# Patient Record
Sex: MALE | Race: BLACK OR AFRICAN AMERICAN | Employment: UNEMPLOYED | URBAN - METROPOLITAN AREA
[De-identification: names, ages, dates, MRNs, and addresses within clinical notes are randomized per-mention and may not be internally consistent; named-entity substitution may affect disease eponyms.]

---

## 2022-02-25 PROBLEM — Z86.69 HISTORY OF SLEEP DISTURBANCE: Status: ACTIVE | Noted: 2022-02-25

## 2022-02-28 ENCOUNTER — HOSPITAL ENCOUNTER (OUTPATIENT)
Age: 52
Setting detail: SPECIMEN
Discharge: HOME | End: 2022-02-28
Payer: COMMERCIAL

## 2022-02-28 DIAGNOSIS — Z76.89 ESTABLISHING CARE WITH NEW DOCTOR, ENCOUNTER FOR: ICD-10-CM

## 2022-02-28 DIAGNOSIS — Z13.1 DIABETES MELLITUS SCREENING: ICD-10-CM

## 2022-02-28 DIAGNOSIS — R36.9 PENILE DISCHARGE: ICD-10-CM

## 2022-02-28 DIAGNOSIS — Z12.5 PROSTATE CANCER SCREENING: ICD-10-CM

## 2022-02-28 DIAGNOSIS — Z11.4 ENCOUNTER FOR SCREENING FOR HIV: ICD-10-CM

## 2022-02-28 DIAGNOSIS — Z11.59 ENCOUNTER FOR HEPATITIS C SCREENING TEST FOR LOW RISK PATIENT: ICD-10-CM

## 2022-02-28 DIAGNOSIS — Z01.89 ENCOUNTER FOR ROUTINE LABORATORY TESTING: ICD-10-CM

## 2022-02-28 LAB
ESTIMATED AVERAGE GLUCOSE: 146 MG/DL
GLUCOSE FASTING: 103 MG/DL (ref 70–99)
HBA1C MFR BLD: 6.7 % (ref 4–6)
HEPATITIS C ANTIBODY: NONREACTIVE
HIV AG/AB: NONREACTIVE
PROSTATE SPECIFIC ANTIGEN: 0.34 UG/L

## 2022-02-28 PROCEDURE — 86803 HEPATITIS C AB TEST: CPT

## 2022-02-28 PROCEDURE — 87491 CHLMYD TRACH DNA AMP PROBE: CPT

## 2022-02-28 PROCEDURE — 87389 HIV-1 AG W/HIV-1&-2 AB AG IA: CPT

## 2022-02-28 PROCEDURE — 36415 COLL VENOUS BLD VENIPUNCTURE: CPT

## 2022-02-28 PROCEDURE — 87591 N.GONORRHOEAE DNA AMP PROB: CPT

## 2022-02-28 PROCEDURE — 82947 ASSAY GLUCOSE BLOOD QUANT: CPT

## 2022-02-28 PROCEDURE — G0103 PSA SCREENING: HCPCS

## 2022-02-28 PROCEDURE — 83036 HEMOGLOBIN GLYCOSYLATED A1C: CPT

## 2022-03-01 LAB
C. TRACHOMATIS DNA ,URINE: NEGATIVE
N. GONORRHOEAE DNA, URINE: NEGATIVE
SPECIMEN DESCRIPTION: NORMAL

## 2022-03-03 PROBLEM — E11.9 NEW ONSET TYPE 2 DIABETES MELLITUS (HCC): Status: ACTIVE | Noted: 2022-03-03

## 2022-07-13 NOTE — PROGRESS NOTES
Patient instructed to remove shoes and socks and instructed to sit in exam chair. Current PCP is HARRISON Wolfe CNP and date of last visit was 07/08/2022. Do you have a follow up visit scheduled?   No  If yes, the date is

## 2022-07-22 ENCOUNTER — OFFICE VISIT (OUTPATIENT)
Dept: PODIATRY | Age: 52
End: 2022-07-22
Payer: COMMERCIAL

## 2022-07-22 VITALS
DIASTOLIC BLOOD PRESSURE: 74 MMHG | BODY MASS INDEX: 35.99 KG/M2 | SYSTOLIC BLOOD PRESSURE: 121 MMHG | WEIGHT: 243 LBS | HEIGHT: 69 IN | RESPIRATION RATE: 76 BRPM

## 2022-07-22 DIAGNOSIS — M79.672 PAIN IN LEFT FOOT: ICD-10-CM

## 2022-07-22 DIAGNOSIS — M24.573 EQUINUS CONTRACTURE OF ANKLE: ICD-10-CM

## 2022-07-22 DIAGNOSIS — M72.2 PLANTAR FASCIITIS, BILATERAL: Primary | ICD-10-CM

## 2022-07-22 PROCEDURE — 3017F COLORECTAL CA SCREEN DOC REV: CPT | Performed by: STUDENT IN AN ORGANIZED HEALTH CARE EDUCATION/TRAINING PROGRAM

## 2022-07-22 PROCEDURE — G8417 CALC BMI ABV UP PARAM F/U: HCPCS | Performed by: STUDENT IN AN ORGANIZED HEALTH CARE EDUCATION/TRAINING PROGRAM

## 2022-07-22 PROCEDURE — 4004F PT TOBACCO SCREEN RCVD TLK: CPT | Performed by: STUDENT IN AN ORGANIZED HEALTH CARE EDUCATION/TRAINING PROGRAM

## 2022-07-22 PROCEDURE — 99203 OFFICE O/P NEW LOW 30 MIN: CPT | Performed by: STUDENT IN AN ORGANIZED HEALTH CARE EDUCATION/TRAINING PROGRAM

## 2022-07-22 PROCEDURE — G8427 DOCREV CUR MEDS BY ELIG CLIN: HCPCS | Performed by: STUDENT IN AN ORGANIZED HEALTH CARE EDUCATION/TRAINING PROGRAM

## 2022-07-22 RX ORDER — METHYLPREDNISOLONE 4 MG/1
TABLET ORAL
Qty: 1 KIT | Refills: 0 | Status: SHIPPED | OUTPATIENT
Start: 2022-07-22 | End: 2022-07-28

## 2022-07-22 NOTE — PROGRESS NOTES
[employer] [facility]  [address] [place] [address],  MARIE Torres  Tel: [phone]   Fax: [phone]    Subjective     CC: right heel pain    HPI:  Deloris Naranjo is a 46y.o. year old male who presents to clinic today for complaints of heel pain to the right foot. Patient reports that he has had the pain for the last 2 weeks. Patient states that the pain is significant worse in the morning. Patient states that he is also starting to experience the pain in his left foot. Patient denies any significant problems. Patient has no other pedal complaints. Primary care physician is Spencer Meckel, APRN - CNP.    ROS:    Constitutional: Denies nausea, vomiting, fever, chills. Neurologic: Denies numbness, tingling, and burning in the feet. Vascular: Denies symptoms of lower extremity claudication. Skin: Denies open wounds. Musculoskeletal: Right heel pain, left foot pain. Otherwise negative except as noted in the HPI. PMH:  Past Medical History:   Diagnosis Date    Anxiety     Asthma        Surgical History:   Past Surgical History:   Procedure Laterality Date    FEMUR FRACTURE SURGERY Right     rods    HIP FRACTURE SURGERY Bilateral     rods    TONSILLECTOMY         Social History:  Social History     Tobacco Use    Smoking status: Some Days     Packs/day: 0.10     Years: 15.00     Pack years: 1.50     Types: Cigars, Cigarettes    Smokeless tobacco: Never   Vaping Use    Vaping Use: Never used   Substance Use Topics    Alcohol use: Yes     Comment: social    Drug use: No       Medications:  Prior to Admission medications    Medication Sig Start Date End Date Taking? Authorizing Provider   methylPREDNISolone (MEDROL DOSEPACK) 4 MG tablet Take by mouth. 7/22/22 7/28/22 Yes Massimo Burnham DPM   gabapentin (NEURONTIN) 100 MG capsule Take 1 capsule by mouth 2 times daily for 30 days.  Intended supply: 30 days 7/8/22 8/7/22 Yes Spencer Meckel, APRN - CNP   metFORMIN (GLUCOPHAGE-XR) 500 mg extended release tablet Take 1 tablet by mouth daily (with breakfast) 5/27/22  Yes HARRISON Wesley CNP   Melatonin 10 MG TABS Take 1 tablet by mouth nightly as needed (Sleep) 2/25/22  Yes HARRISON Wesley CNP       Objective     Vitals:    07/22/22 1405   BP: 121/74   Resp: (!) 76       Lab Results   Component Value Date    LABA1C 6.1 05/27/2022       Physical Exam:  General:  Alert and oriented x3. In no acute distress. Lower Extremity Physical Exam:    Vascular: DP and PT pulses are palpable. CFT <3 seconds to all digits. Mild plantar edema to b/l foot. Hair growth is absent to the level of the digits. Neuro: Saph/sural/SP/DP/plantar sensation intact to light touch. Musculoskeletal: Muscle strength tests 5/5 to al major muscle groups. Tenderness to palpation of medial tubercle of the b/l  calcaneus. No significant pain to palpation at any other spot. Decreased ankle range of motion. Dermatologic:no open lesions. Interdigital maceration absent. Nails 1-5 are wnl. Imaging: none    Assessment   Deon Black is a 46 y.o. male with     Diagnosis Orders   1. Plantar fasciitis, bilateral        2. Pain in left foot        3. Equinus contracture of ankle             Plan   The patient presented to our clinic today for plantar fasciitis  Recommend patient obtain shoes with good support and also obtain powerstep orthotics. Insturcted patient on at home calf stretches. Rx for medrol dosepak sent to patient's pharmacy. We discussed steroid injection into the plantar fascia, patient declined at this time. .   Patient to return to clinic in 1 month to follow-up. Please call the office with any questions or concerns. Orders Placed This Encounter   Medications    methylPREDNISolone (MEDROL DOSEPACK) 4 MG tablet     Sig: Take by mouth. Dispense:  1 kit     Refill:  0     No orders of the defined types were placed in this encounter.       Kailey Rowley DPM   Podiatric Medicine &

## 2023-01-04 ENCOUNTER — NURSE TRIAGE (OUTPATIENT)
Dept: OTHER | Facility: CLINIC | Age: 53
End: 2023-01-04

## 2023-01-04 NOTE — TELEPHONE ENCOUNTER
Location of patient: [address] call from Adrian at [facility] with [facility]. Subjective: Caller states \"having dizziness, sob, night sweats for the past week\"     Current Symptoms: He has been having night sweats, will wake in middle of night with blurry vision, feeling sob, dry mouth and dizzy. Will go outside and come back feel better. This is now happening every night. During the day he is fine. Patient girlfriend, Sher Gipson, calling. Patient is currently sleeping. Limitied triage    Onset: 2 weeks ago; gradual    Associated Symptoms: NA    Pain Severity:     Temperature:      What has been tried:     LMP: NA Pregnant: NA    Recommended disposition: See PCP within 3 Days    Care advice provided, patient verbalizes understanding; denies any other questions or concerns; instructed to call back for any new or worsening symptoms. Patient/Caller agrees with recommended disposition; writer provided warm transfer to [facility] at [facility] for appointment scheduling    Attention Provider: Thank you for allowing me to participate in the care of your patient. The patient was connected to triage in response to information provided to the ECC/PSC. Please do not respond through this encounter as the response is not directed to a shared pool.     Reason for Disposition   MILD dizziness (e.g., walking normally) and has NOT been evaluated by physician for this (Exception: dizziness caused by heat exposure, sudden standing, or poor fluid intake)    Protocols used: Dizziness-ADULT-[place]

## 2023-01-06 ENCOUNTER — OFFICE VISIT (OUTPATIENT)
Dept: FAMILY MEDICINE CLINIC | Age: 53
End: 2023-01-06
Payer: COMMERCIAL

## 2023-01-06 VITALS
BODY MASS INDEX: 38.18 KG/M2 | TEMPERATURE: 96.6 F | SYSTOLIC BLOOD PRESSURE: 135 MMHG | HEART RATE: 76 BPM | WEIGHT: 257.8 LBS | HEIGHT: 69 IN | DIASTOLIC BLOOD PRESSURE: 76 MMHG

## 2023-01-06 DIAGNOSIS — Z13.220 SCREENING FOR HYPERLIPIDEMIA: ICD-10-CM

## 2023-01-06 DIAGNOSIS — R40.0 DAYTIME SLEEPINESS: ICD-10-CM

## 2023-01-06 DIAGNOSIS — E66.09 CLASS 2 OBESITY DUE TO EXCESS CALORIES WITH BODY MASS INDEX (BMI) OF 38.0 TO 38.9 IN ADULT, UNSPECIFIED WHETHER SERIOUS COMORBIDITY PRESENT: ICD-10-CM

## 2023-01-06 DIAGNOSIS — R06.83 SNORING: ICD-10-CM

## 2023-01-06 DIAGNOSIS — E11.9 TYPE 2 DIABETES MELLITUS WITHOUT COMPLICATION, WITHOUT LONG-TERM CURRENT USE OF INSULIN (HCC): Primary | ICD-10-CM

## 2023-01-06 DIAGNOSIS — Z72.820 POOR SLEEP: ICD-10-CM

## 2023-01-06 LAB — HBA1C MFR BLD: 6.3 %

## 2023-01-06 PROCEDURE — 83036 HEMOGLOBIN GLYCOSYLATED A1C: CPT

## 2023-01-06 RX ORDER — METFORMIN HYDROCHLORIDE 500 MG/1
500 TABLET, EXTENDED RELEASE ORAL
Qty: 90 TABLET | Refills: 1 | Status: SHIPPED | OUTPATIENT
Start: 2023-01-06

## 2023-01-06 ASSESSMENT — ENCOUNTER SYMPTOMS
CONSTIPATION: 0
WHEEZING: 0
NAUSEA: 0
DIARRHEA: 0
ABDOMINAL PAIN: 0
SHORTNESS OF BREATH: 0
CHOKING: 0

## 2023-01-06 ASSESSMENT — PATIENT HEALTH QUESTIONNAIRE - PHQ9
SUM OF ALL RESPONSES TO PHQ9 QUESTIONS 1 & 2: 0
2. FEELING DOWN, DEPRESSED OR HOPELESS: 0
SUM OF ALL RESPONSES TO PHQ QUESTIONS 1-9: 0
1. LITTLE INTEREST OR PLEASURE IN DOING THINGS: 0
SUM OF ALL RESPONSES TO PHQ QUESTIONS 1-9: 0

## 2023-01-06 NOTE — PROGRESS NOTES
[address] [facility] - Outpatient Note      Subjective:    Candida Renee is a 46 y.o. male with  has a past medical history of Anxiety and Asthma. Presented to the office today for:  Chief Complaint   Patient presents with    New Patient     Est. Care    Diabetes     Check up        HPI      27-year-old male presenting to clinic to establish care. Patient was recently diagnosed with type 2 diabetes about 8 months ago, at that time A1c was 6.7%. Patient was placed on metformin 500 once a day. Patient states that he does not fully understand diabetes, complications associated with it and does not have a clue what he should be eating. Patient requesting to see diabetes educator. States that he is working on losing weight but has been challenging, also has complaints of daytime fatigue, multiple nighttime awakenings, snoring/apneic episodes, difficulties falling asleep. Patient was previously placed on melatonin which provided no relief. Patient states that he was previously sent to do sleep study which he is not able to make it to. Review of Systems   Constitutional:  Positive for fatigue. Negative for chills and fever. Sleep issues     Eyes:  Negative for visual disturbance. Respiratory:  Negative for choking, shortness of breath and wheezing. Cardiovascular:  Negative for chest pain, palpitations and leg swelling. Gastrointestinal:  Negative for abdominal pain, constipation, diarrhea and nausea. Neurological:  Negative for dizziness and headaches. Psychiatric/Behavioral:  Negative for agitation.         The patient has a   Family History   Problem Relation Age of Onset    COPD Mother         covid    Diabetes Father        Objective:    /76 (Site: Right Upper Arm, Position: Sitting, Cuff Size: Large Adult) Comment: machine  Pulse 76   Temp (!) 96.6 °F (35.9 °C) (Temporal)   Ht 5' 9.02\" (1.753 m)   Wt 257 lb 12.8 oz (116.9 kg) BMI 38.05 kg/m²    BP Readings from Last 3 Encounters:   01/06/23 135/76   07/22/22 121/74   07/08/22 128/86       Physical Exam  Constitutional:       Appearance: Normal appearance. He is obese. Cardiovascular:      Rate and Rhythm: Normal rate and regular rhythm. Pulses: Normal pulses. Heart sounds: Normal heart sounds. No murmur heard. No gallop. Pulmonary:      Effort: Pulmonary effort is normal.      Breath sounds: Normal breath sounds. No wheezing, rhonchi or rales. Abdominal:      General: There is no distension. Palpations: Abdomen is soft. Tenderness: There is no abdominal tenderness. Musculoskeletal:      Right lower leg: No edema. Left lower leg: No edema. Skin:     General: Skin is warm. Neurological:      Mental Status: He is alert. Psychiatric:         Mood and Affect: Mood normal.       Lab Results   Component Value Date    PSA 0.34 02/28/2022    GLUF 103 (H) 02/28/2022    LABA1C 6.3 01/06/2023     No results found for: CALCIUM, PHOS  No results found for: LDLCALC, LDLCHOLESTEROL, LDLDIRECT    Assessment and Plan:    1. Type 2 diabetes mellitus without complication, without long-term current use of insulin ([facility])  A1c today is 6.3%. Did discuss with patient about potentially getting him off of medications in the future, once patient has completed diabetes education has a full diet/lifestyle plan in place to help him maintain his A1c.  -Patient agreeable to going to diabetes education  - POCT glycosylated hemoglobin (Hb A1C)  - [facility] Diabetes Education - [address]  - Lipid Panel; Future  - Protein / Creatinine Ratio, Urine; Future  - Comprehensive Metabolic Panel; Future  - metFORMIN (GLUCOPHAGE-XR) 500 MG extended release tablet; Take 1 tablet by mouth daily (with breakfast)  Dispense: 90 tablet; Refill: 1    2.  Daytime sleepiness  Has had complaints of daytime fatigue and napping, restlessness throughout the night, difficulties falling asleep, snoring and apneic episodes. Patient does have BMI of 38. Previously been referral for sleep study by prior physician which he was unable to make it to. Patient very agreeable to getting sleep study this time as he likely has sleep apnea and will require CPAP machine.  - Sleep Study with PAP Titration; Future    3. Poor sleep  - Sleep Study with PAP Titration; Future    4. Snoring  - Sleep Study with PAP Titration; Future    5. Class 2 obesity due to excess calories with body mass index (BMI) of 38.0 to 38.9 in adult, unspecified whether serious comorbidity present  - [facility] Diabetes [facility]  - Sleep Study with PAP Titration; Future    6. Screening for hyperlipidemia  - Lipid Panel; Future          Requested Prescriptions     Signed Prescriptions Disp Refills    metFORMIN (GLUCOPHAGE-XR) 500 MG extended release tablet 90 tablet 1     Sig: Take 1 tablet by mouth daily (with breakfast)       Medications Discontinued During This Encounter   Medication Reason    gabapentin (NEURONTIN) 100 MG capsule Therapy completed    metFORMIN (GLUCOPHAGE-XR) 500 MG extended release tablet [place] received counseling on the following healthy behaviors: nutrition, exercise and medication adherence    Discussed use,benefit, and side effects of prescribed medications. Barriers to medication compliance addressed. All patient questions answered. Pt voiced understanding. Return in about 3 months (around 4/6/2023), or if symptoms worsen or fail to improve, for DM . Disclaimer: Some orall of this note was transcribed using voice-recognition software. This may cause typographical errors occasionally. Although all effort is made to fix these errors, please do not hesitate to contact our office if there Metzger Courser concern with the understanding of this note.

## 2023-01-06 NOTE — PROGRESS NOTES
Diabetic visit information    BP Readings from Last 3 Encounters:   23 135/76   22 121/74   22 128/86       Hemoglobin A1C (%)   Date Value   2022 6.1   2022 6.7 (H)               Have you changed or started any medications since your last visit including any over-the-counter medicines, vitamins, or herbal medicines? no   Have you stopped taking any of your medications? Is so, why? -  no  Are you having any side effects from any of your medications? - no    Have you seen any other physician or provider since your last visit? yes - Podiatry, PCP   Have you had any other diagnostic tests since your last visit?  no   Have you been seen in the emergency room and/or had an admission in a hospital since we last saw you?  no     Have you had your annual diabetic retinal (eye) exam? No   (ensure copy of exam is in the chart)    Have you had your routine dental cleaning in the past 6 months? no    Do you have an active [employer] account? If not, what are your barriers? No:     Patient Care Team:  HARRISON Guerin CNP as PCP - General (Nurse Practitioner)  HARR[facility]in C[facility] as P[facility]    Medical history Review  Past Medical, Family, and Social History reviewed and does contribute to the patient presenting condition.     Health Maintenance   Topic Date Due    Diabetic foot exam  Never done    Diabetic Alb to Cr ratio (uACR) test  Never done    Diabetic retinal exam  Never done    GFR test (Diabetes, CKD 3-4, OR last GFR 15-59)  Never done    Hepatitis B vaccine (1 of 3 - Risk 3-dose series) Never done    Colorectal Cancer Screen  Never done    Flu vaccine (1) Never done    Lipids  2023    DTaP/Tdap/Td vaccine (1 - Tdap) 2023 (Originally 7/10/1989)    Shingles vaccine (1 of 2) 2023 (Originally 7/10/2020)    Pneumococcal 0-64 years Vaccine (1 - PCV) 2023 (Originally 7/10/1976)    COVID-19 Vaccine (1) 2023 (Originally 1/10/1971)    Depression Screen  02/25/2023    A1C test (Diabetic or Prediabetic)  05/27/2023    Hepatitis C screen  Completed    HIV screen  Completed    Hepatitis A vaccine  Aged Out    Hib vaccine  Aged Out    Meningococcal (ACWY) vaccine  Aged Out

## 2023-01-06 NOTE — PROGRESS NOTES
Attending Physician Statement  I have discussed the care of Carlos Burger 46 y.o. male, including pertinent history and exam findings, with the resident Dr. Karen Meza MD.    History and Exam:   Chief Complaint   Patient presents with    New Patient     Est. Care    Diabetes     Check up        Past Medical History:   Diagnosis Date    Anxiety     Asthma      No Known Allergies   I have seen and examined the patient and the key elements of the encounter have been performed by me. BP Readings from Last 3 Encounters:   01/06/23 135/76   07/22/22 121/74   07/08/22 128/86     /76 (Site: Right Upper Arm, Position: Sitting, Cuff Size: Large Adult) Comment: machine  Pulse 76   Temp (!) 96.6 °F (35.9 °C) (Temporal)   Ht 5' 9.02\" (1.753 m)   Wt 257 lb 12.8 oz (116.9 kg)   BMI 38.05 kg/m²   Lab Results   Component Value Date    PSA 0.34 02/28/2022    GLUF 103 (H) 02/28/2022    LABA1C 6.3 01/06/2023     No results found for: LABPROT, LABALBU  No results found for: IRON, TIBC, FERRITIN  No results found for: LDLCALC, LDLCHOLESTEROL, LDLDIRECT  I agree with the assessment, plan and the diagnosis of    Diagnosis Orders   1. Type 2 diabetes mellitus without complication, without long-term current use of insulin (HCC)  POCT glycosylated hemoglobin (Hb A1C)    [facility] Diabetes [facility]    Lipid Panel    Protein / Creatinine Ratio, Urine    Comprehensive Metabolic Panel    metFORMIN (GLUCOPHAGE-XR) 500 MG extended release tablet      2. Daytime sleepiness  Sleep Study with PAP Titration      3. Poor sleep  Sleep Study with PAP Titration      4. Snoring  Sleep Study with PAP Titration      5. Class 2 obesity due to excess calories with body mass index (BMI) of 38.0 to 38.9 in adult, unspecified whether serious comorbidity present  [address] Diabetes [facility]    Sleep Study with PAP Titration      6. Screening for hyperlipidemia  Lipid Panel       .  I agree with orders as documented by the resident. More than 25 minutes spent  in face to face encounter with the patient and more than half in counseling. Patient's questions were answered. Patient Voiced understanding to the counseling. Return in about 3 months (around 4/6/2023), or if symptoms worsen or fail to improve, for DM .    (GC Modifier)-Dr. Vinicio Cihn MD

## 2023-01-24 ENCOUNTER — HOSPITAL ENCOUNTER (OUTPATIENT)
Age: 53
Setting detail: SPECIMEN
Discharge: HOME | End: 2023-01-24

## 2023-01-24 DIAGNOSIS — E11.9 TYPE 2 DIABETES MELLITUS WITHOUT COMPLICATION, WITHOUT LONG-TERM CURRENT USE OF INSULIN (HCC): ICD-10-CM

## 2023-01-24 DIAGNOSIS — Z13.220 SCREENING FOR HYPERLIPIDEMIA: ICD-10-CM

## 2023-01-24 LAB
ALBUMIN SERPL-MCNC: 4.2 G/DL (ref 3.5–5.2)
ALBUMIN/GLOBULIN RATIO: 1.3 (ref 1–2.5)
ALP BLD-CCNC: 82 U/L (ref 40–129)
ALT SERPL-CCNC: 39 U/L (ref 5–41)
ANION GAP SERPL CALCULATED.3IONS-SCNC: 13 MMOL/L (ref 9–17)
AST SERPL-CCNC: 27 U/L
BILIRUB SERPL-MCNC: 0.3 MG/DL (ref 0.3–1.2)
BUN BLDV-MCNC: 14 MG/DL (ref 6–20)
CALCIUM SERPL-MCNC: 9.2 MG/DL (ref 8.6–10.4)
CHLORIDE BLD-SCNC: 103 MMOL/L (ref 98–107)
CHOLESTEROL/HDL RATIO: 4.5
CHOLESTEROL: 183 MG/DL
CO2: 24 MMOL/L (ref 20–31)
CREAT SERPL-MCNC: 0.74 MG/DL (ref 0.7–1.2)
CREATININE URINE: 104.8 MG/DL (ref 39–259)
GFR SERPL CREATININE-BSD FRML MDRD: >60 ML/MIN/1.73M2
GLUCOSE BLD-MCNC: 107 MG/DL (ref 70–99)
HDLC SERPL-MCNC: 41 MG/DL
LDL CHOLESTEROL: 127 MG/DL (ref 0–130)
POTASSIUM SERPL-SCNC: 4.3 MMOL/L (ref 3.7–5.3)
SODIUM BLD-SCNC: 140 MMOL/L (ref 135–144)
TOTAL PROTEIN, URINE: 6 MG/DL
TOTAL PROTEIN: 7.4 G/DL (ref 6.4–8.3)
TRIGL SERPL-MCNC: 77 MG/DL
URINE TOTAL PROTEIN CREATININE RATIO: 0.06 (ref 0–0.2)

## 2023-07-28 ENCOUNTER — OFFICE VISIT (OUTPATIENT)
Dept: FAMILY MEDICINE CLINIC | Age: 53
End: 2023-07-28
Payer: COMMERCIAL

## 2023-07-28 VITALS
DIASTOLIC BLOOD PRESSURE: 92 MMHG | HEIGHT: 69 IN | SYSTOLIC BLOOD PRESSURE: 148 MMHG | TEMPERATURE: 97.1 F | HEART RATE: 76 BPM | BODY MASS INDEX: 39.6 KG/M2 | WEIGHT: 267.4 LBS

## 2023-07-28 DIAGNOSIS — E11.9 TYPE 2 DIABETES MELLITUS WITHOUT COMPLICATION, WITHOUT LONG-TERM CURRENT USE OF INSULIN (HCC): ICD-10-CM

## 2023-07-28 DIAGNOSIS — R07.9 CHEST PAIN, UNSPECIFIED TYPE: Primary | ICD-10-CM

## 2023-07-28 DIAGNOSIS — Z12.11 COLON CANCER SCREENING: ICD-10-CM

## 2023-07-28 DIAGNOSIS — R06.83 SNORING: ICD-10-CM

## 2023-07-28 LAB — HBA1C MFR BLD: 6.3 %

## 2023-07-28 PROCEDURE — 3017F COLORECTAL CA SCREEN DOC REV: CPT

## 2023-07-28 PROCEDURE — 99213 OFFICE O/P EST LOW 20 MIN: CPT

## 2023-07-28 PROCEDURE — 4004F PT TOBACCO SCREEN RCVD TLK: CPT

## 2023-07-28 PROCEDURE — G8427 DOCREV CUR MEDS BY ELIG CLIN: HCPCS

## 2023-07-28 PROCEDURE — 2022F DILAT RTA XM EVC RTNOPTHY: CPT

## 2023-07-28 PROCEDURE — 83037 HB GLYCOSYLATED A1C HOME DEV: CPT

## 2023-07-28 PROCEDURE — 3044F HG A1C LEVEL LT 7.0%: CPT

## 2023-07-28 PROCEDURE — G8417 CALC BMI ABV UP PARAM F/U: HCPCS

## 2023-07-28 ASSESSMENT — ENCOUNTER SYMPTOMS
WHEEZING: 0
COUGH: 0
VOMITING: 0
SHORTNESS OF BREATH: 0
DIARRHEA: 0
ABDOMINAL PAIN: 0
NAUSEA: 0

## 2023-07-28 ASSESSMENT — PATIENT HEALTH QUESTIONNAIRE - PHQ9
SUM OF ALL RESPONSES TO PHQ QUESTIONS 1-9: 2
SUM OF ALL RESPONSES TO PHQ9 QUESTIONS 1 & 2: 2
SUM OF ALL RESPONSES TO PHQ QUESTIONS 1-9: 2
SUM OF ALL RESPONSES TO PHQ QUESTIONS 1-9: 2
2. FEELING DOWN, DEPRESSED OR HOPELESS: 1
1. LITTLE INTEREST OR PLEASURE IN DOING THINGS: 1
SUM OF ALL RESPONSES TO PHQ QUESTIONS 1-9: 2

## 2023-07-28 NOTE — PROGRESS NOTES
Diabetic visit information    BP Readings from Last 3 Encounters:   01/06/23 135/76   07/22/22 121/74   07/08/22 128/86       Hemoglobin A1C (%)   Date Value   01/06/2023 6.3   05/27/2022 6.1   02/28/2022 6.7 (H)     LDL Cholesterol (mg/dL)   Date Value   01/24/2023 127               Have you changed or started any medications since your last visit including any over-the-counter medicines, vitamins, or herbal medicines? no   Have you stopped taking any of your medications? Is so, why? -  no  Are you having any side effects from any of your medications? - no    Have you seen any other physician or provider since your last visit?  no   Have you had any other diagnostic tests since your last visit?  no   Have you been seen in the emergency room and/or had an admission in a hospital since we last saw you?  no     Have you had your annual diabetic retinal (eye) exam? No   (ensure copy of exam is in the chart)    Have you had your routine dental cleaning in the past 6 months? no    Do you have an active [employer] account? If not, what are your barriers? No: pending     Patient Care Team:  Lisa Gorman MD as PCP - General (Family Medicine)    Medical history Review  Past Medical, Family, and Social History reviewed and does not contribute to the patient presenting condition.     Health Maintenance   Topic Date Due    COVID-19 Vaccine (1) Never done    Pneumococcal 0-64 years Vaccine (1 - PCV) Never done    Diabetic foot exam  Never done    Diabetic retinal exam  Never done    Hepatitis B vaccine (1 of 3 - Risk 3-dose series) Never done    DTaP/Tdap/Td vaccine (1 - Tdap) Never done    Colorectal Cancer Screen  Never done    Shingles vaccine (1 of 2) Never done    Flu vaccine (1) 08/01/2023    A1C test (Diabetic or Prediabetic)  01/06/2024    Depression Screen  01/06/2024    Diabetic Alb to Cr ratio (uACR) test  01/24/2024    Lipids  01/24/2024    GFR test (Diabetes, CKD 3-4, OR last GFR 15-59)  01/24/2024    Hepatitis C

## 2023-07-28 NOTE — PATIENT INSTRUCTIONS
Thank you for letting us take care of you today. We hope all your questions were addressed. If a question was overlooked or something else comes to mind after you return home, please contact a member of your Care Team listed below. Your Care Team at [address] [facility] is Team #1  Jennifer Domingo, Faculty Advisor  Maynor Hogan, Resident Physician  Robert Mehta, Resident Physician  Ester Schmitt, Resident Physician  Mj Valencia, [facility], [address] [facility], [address] [facility], [address]  [facility], [address] [facility], [facility]  Korina Otto, [facility]  Kayce Mcmanus, [address] [facility] [place],   Braulio Ballard, [facility] (Clinical Pharmacist)     Office phone number: [phone]    If you need to get in right away due to illness, please be advised we have \"Same Day\" appointments available Monday-Friday. Please call us at [phone] option #3 to schedule your \"Same Day\" appointment.

## 2023-07-28 NOTE — PROGRESS NOTES
John Pate (:  1970) is a 48 y.o. male,Established patient, here for evaluation of the following chief complaint(s):  Diabetes, Chest Pain, Fatigue, and Health Maintenance (Pt due for colon cancer screening pt st last week blood in stool a couple times, please discuss options. No DM eye exam )         ASSESSMENT/PLAN:  1. Chest pain, unspecified type  -Low suspicious for CAD or ACS. Pain is likely musculoskeletal.  I told the patient to keep monitoring. I told the patient to go to the ED if his pain nature or severity changes, encouraged him to the ED if the chest pain becomes crushing, heaviness or squeezing. Patient voiced understanding  -Consider stress test if the pain persist or get worse  -Patient blood pressure is slightly elevated today in the office. I told the patient to keep an eye on his blood pressure and follow-up in 2 weeks  2. Type 2 diabetes mellitus without complication, without long-term current use of insulin (HCC)  -     POCT glycosylated hemoglobin (Hb A1C) 6.3  -Continue metformin  3. Colon cancer screening  -     We will refer for GI for blood in stool and colon cancer screening. Patient voiced understanding. Patient is hesitant about having colonoscopy because he said that has phobia about putting him to sleep for the procedure. Patient was educated about the importance of the procedure. Victoriano Bowden MD, Gastroenterology, [facility]  4. Snoring  -Encouraged the patient to get the sleep study done. Patient voiced understanding    -Return in about 2 weeks (around 2023) for HTN FU. Subjective   SUBJECTIVE/OBJECTIVE:  48years old male patient with past medical history of prediabetes to the office complaining of chest pain that has been going for around 2 days. Patient said that the chest pain started 2 days ago while he was playing with his daughter.   The pain is over the left border of the sternum, 7 out of 10, feels like a muscle pull and does not

## 2023-08-11 ENCOUNTER — OFFICE VISIT (OUTPATIENT)
Dept: FAMILY MEDICINE CLINIC | Age: 53
End: 2023-08-11
Payer: COMMERCIAL

## 2023-08-11 VITALS
SYSTOLIC BLOOD PRESSURE: 162 MMHG | DIASTOLIC BLOOD PRESSURE: 96 MMHG | HEART RATE: 80 BPM | WEIGHT: 265 LBS | BODY MASS INDEX: 39.25 KG/M2 | HEIGHT: 69 IN

## 2023-08-11 DIAGNOSIS — Z12.11 COLON CANCER SCREENING: ICD-10-CM

## 2023-08-11 DIAGNOSIS — M25.461 PAIN AND SWELLING OF KNEE, RIGHT: ICD-10-CM

## 2023-08-11 DIAGNOSIS — E11.9 TYPE 2 DIABETES MELLITUS WITHOUT COMPLICATION, WITHOUT LONG-TERM CURRENT USE OF INSULIN (HCC): ICD-10-CM

## 2023-08-11 DIAGNOSIS — M25.561 PAIN AND SWELLING OF KNEE, RIGHT: ICD-10-CM

## 2023-08-11 DIAGNOSIS — I10 PRIMARY HYPERTENSION: Primary | ICD-10-CM

## 2023-08-11 PROCEDURE — G8427 DOCREV CUR MEDS BY ELIG CLIN: HCPCS

## 2023-08-11 PROCEDURE — 3077F SYST BP >= 140 MM HG: CPT

## 2023-08-11 PROCEDURE — 3080F DIAST BP >= 90 MM HG: CPT

## 2023-08-11 PROCEDURE — 3044F HG A1C LEVEL LT 7.0%: CPT

## 2023-08-11 PROCEDURE — 4004F PT TOBACCO SCREEN RCVD TLK: CPT

## 2023-08-11 PROCEDURE — 99213 OFFICE O/P EST LOW 20 MIN: CPT

## 2023-08-11 PROCEDURE — 2022F DILAT RTA XM EVC RTNOPTHY: CPT

## 2023-08-11 PROCEDURE — 3017F COLORECTAL CA SCREEN DOC REV: CPT

## 2023-08-11 PROCEDURE — G8417 CALC BMI ABV UP PARAM F/U: HCPCS

## 2023-08-11 RX ORDER — AMLODIPINE BESYLATE 5 MG/1
5 TABLET ORAL DAILY
Qty: 30 TABLET | Refills: 3 | Status: SHIPPED | OUTPATIENT
Start: 2023-08-11

## 2023-08-11 RX ORDER — METFORMIN HYDROCHLORIDE 500 MG/1
500 TABLET, EXTENDED RELEASE ORAL
Qty: 90 TABLET | Refills: 1 | Status: SHIPPED | OUTPATIENT
Start: 2023-08-11

## 2023-08-11 ASSESSMENT — ENCOUNTER SYMPTOMS
SHORTNESS OF BREATH: 0
CHEST TIGHTNESS: 0
COUGH: 0
VOMITING: 0
BLOOD IN STOOL: 0
DIARRHEA: 0
CONSTIPATION: 0
NAUSEA: 0
ABDOMINAL PAIN: 0

## 2023-08-11 NOTE — PATIENT INSTRUCTIONS
Thank you for letting us take care of you today. We hope all your questions were addressed. If a question was overlooked or something else comes to mind after you return home, please contact a member of your Care Team listed below. Your Care Team at [address] [facility] is Team #1  Cass Mehta, Faculty Advisor  Demetrius Mathews, Resident Physician  Ben Garcia, Resident Physician  Stalin Oliveros, Resident Physician  Yang Keith, [facility], [address] [facility], [address] [facility], 111  [facility], [address] [facility], [facility]  Joanne Long, Critical access hospital  Jesus Jean-Baptiste, [address] [facility] [place],   Roxanna Gurrola, [facility] (Clinical Pharmacist)     Office phone number: [phone]    If you need to get in right away due to illness, please be advised we have \"Same Day\" appointments available Monday-Friday. Please call us at [phone] option #3 to schedule your \"Same Day\" appointment.

## 2023-12-13 DIAGNOSIS — I10 PRIMARY HYPERTENSION: ICD-10-CM

## 2023-12-13 NOTE — TELEPHONE ENCOUNTER
[facility] Please review and e-scribe if applicable.      Last Visit Date:  08/11/23  Next Visit Date:  Visit date not found    Hemoglobin A1C (%)   Date Value   07/28/2023 6.3   01/06/2023 6.3   05/27/2022 6.1             ( goal A1C is < 7)   No components found for: \"LABMICR\"  LDL Cholesterol (mg/dL)   Date Value   01/24/2023 127       (goal LDL is <100)   AST (U/L)   Date Value   01/24/2023 27     ALT (U/L)   Date Value   01/24/2023 39     BUN (mg/dL)   Date Value   01/24/2023 14     BP Readings from Last 3 Encounters:   08/11/23 (!) 162/96   07/28/23 (!) 148/92   01/06/23 135/76          (goal 120/80)        Patient Active Problem List:     History of sleep disturbance     New onset type 2 diabetes mellitus ([address])

## 2023-12-14 ENCOUNTER — TELEPHONE (OUTPATIENT)
Dept: FAMILY MEDICINE CLINIC | Age: 53
End: 2023-12-14

## 2023-12-14 NOTE — TELEPHONE ENCOUNTER
Patient contacted office in regards to medication refill. Writer informed pending, was sent yesterday to provider and can take 24-48 hours for reply. Patient daughter Navarro Naranjo called and was not on HIPAA. Writer informed patient to on next appointment or when able to can stop in office and update HIPAA to have daughter listed.

## 2023-12-15 RX ORDER — AMLODIPINE BESYLATE 5 MG/1
5 TABLET ORAL DAILY
Qty: 30 TABLET | Refills: 3 | Status: SHIPPED | OUTPATIENT
Start: 2023-12-15

## 2024-01-26 ENCOUNTER — TELEPHONE (OUTPATIENT)
Dept: FAMILY MEDICINE CLINIC | Age: 54
End: 2024-01-26

## 2024-01-26 DIAGNOSIS — R06.83 SNORING: ICD-10-CM

## 2024-01-26 DIAGNOSIS — R40.0 DAYTIME SLEEPINESS: Primary | ICD-10-CM

## 2024-01-26 DIAGNOSIS — E66.09 CLASS 2 OBESITY DUE TO EXCESS CALORIES WITH BODY MASS INDEX (BMI) OF 38.0 TO 38.9 IN ADULT, UNSPECIFIED WHETHER SERIOUS COMORBIDITY PRESENT: ICD-10-CM

## 2024-01-26 NOTE — TELEPHONE ENCOUNTER
Patient contacting office for a new sleep study order. Patient was not sure which one he had ordered previously, pending please advise.

## 2024-01-28 DIAGNOSIS — E11.9 TYPE 2 DIABETES MELLITUS WITHOUT COMPLICATION, WITHOUT LONG-TERM CURRENT USE OF INSULIN (HCC): ICD-10-CM

## 2024-01-29 RX ORDER — METFORMIN HYDROCHLORIDE 500 MG/1
TABLET, EXTENDED RELEASE ORAL
Qty: 90 TABLET | Refills: 1 | Status: SHIPPED | OUTPATIENT
Start: 2024-01-29

## 2024-01-29 NOTE — TELEPHONE ENCOUNTER
E-scribe request for METFORMIN. Please review and e-scribe if applicable.     Last Visit Date:  8/11/2023  Next Visit Date:  Visit date not found    Hemoglobin A1C (%)   Date Value   07/28/2023 6.3   01/06/2023 6.3   05/27/2022 6.1             ( goal A1C is < 7)   No components found for: \"LABMICR\"  LDL Cholesterol (mg/dL)   Date Value   01/24/2023 127       (goal LDL is <100)   AST (U/L)   Date Value   01/24/2023 27     ALT (U/L)   Date Value   01/24/2023 39     BUN (mg/dL)   Date Value   01/24/2023 14     BP Readings from Last 3 Encounters:   08/11/23 (!) 162/96   07/28/23 (!) 148/92   01/06/23 135/76          (goal 120/80)        Patient Active Problem List:     History of sleep disturbance     New onset type 2 diabetes mellitus (HCC)      ----JF

## 2024-02-15 ENCOUNTER — HOSPITAL ENCOUNTER (OUTPATIENT)
Dept: SLEEP CENTER | Age: 54
Discharge: HOME | End: 2024-02-17
Payer: COMMERCIAL

## 2024-02-15 DIAGNOSIS — R40.0 DAYTIME SLEEPINESS: ICD-10-CM

## 2024-02-15 DIAGNOSIS — R06.83 SNORING: ICD-10-CM

## 2024-02-15 DIAGNOSIS — E66.09 CLASS 2 OBESITY DUE TO EXCESS CALORIES WITH BODY MASS INDEX (BMI) OF 38.0 TO 38.9 IN ADULT, UNSPECIFIED WHETHER SERIOUS COMORBIDITY PRESENT: ICD-10-CM

## 2024-02-15 PROCEDURE — 95810 POLYSOM 6/> YRS 4/> PARAM: CPT

## 2024-02-16 VITALS
RESPIRATION RATE: 16 BRPM | OXYGEN SATURATION: 92 % | WEIGHT: 265 LBS | HEART RATE: 69 BPM | HEIGHT: 69 IN | BODY MASS INDEX: 39.25 KG/M2

## 2024-02-20 ENCOUNTER — TELEPHONE (OUTPATIENT)
Dept: GASTROENTEROLOGY | Age: 54
End: 2024-02-20

## 2024-02-21 DIAGNOSIS — Z12.11 COLON CANCER SCREENING: Primary | ICD-10-CM

## 2024-02-21 RX ORDER — BISACODYL 5 MG/1
TABLET, DELAYED RELEASE ORAL
Qty: 4 TABLET | Refills: 0 | Status: SHIPPED | OUTPATIENT
Start: 2024-02-21

## 2024-02-21 RX ORDER — POLYETHYLENE GLYCOL 3350, SODIUM SULFATE ANHYDROUS, SODIUM BICARBONATE, SODIUM CHLORIDE, POTASSIUM CHLORIDE 236; 22.74; 6.74; 5.86; 2.97 G/4L; G/4L; G/4L; G/4L; G/4L
POWDER, FOR SOLUTION ORAL
Qty: 4000 ML | Refills: 0 | Status: SHIPPED | OUTPATIENT
Start: 2024-02-21

## 2024-02-21 NOTE — TELEPHONE ENCOUNTER
Writer returned pt call to schedule colon, writer notes pt is not established with any provider in this practice, per colon screen questionnaire pt can be scheduled for colon.    [place] Friday 4/5/24@11:00am colon screen(new) golytely/dulc.    Reviewed bowel prep instructions with patient over phone and mailed to home address.

## 2024-02-22 LAB — STATUS: NORMAL

## 2024-02-29 DIAGNOSIS — G47.33 OSA (OBSTRUCTIVE SLEEP APNEA): ICD-10-CM

## 2024-02-29 DIAGNOSIS — E11.9 TYPE 2 DIABETES MELLITUS WITHOUT COMPLICATION, WITHOUT LONG-TERM CURRENT USE OF INSULIN (HCC): Primary | ICD-10-CM

## 2024-03-01 ENCOUNTER — TELEPHONE (OUTPATIENT)
Dept: FAMILY MEDICINE CLINIC | Age: 54
End: 2024-03-01

## 2024-03-01 NOTE — TELEPHONE ENCOUNTER
----- Message from Marga Santoyo MD sent at 2/29/2024  8:47 AM EST -----  Please let patient know sleep study does show sleep apnea, he needs to return to sleep center do get CPAP/BiPAP fitting/settings.    thanks

## 2024-03-10 ENCOUNTER — HOSPITAL ENCOUNTER (OUTPATIENT)
Dept: SLEEP CENTER | Age: 54
Discharge: HOME | End: 2024-03-12
Payer: COMMERCIAL

## 2024-03-10 VITALS — HEIGHT: 69 IN | BODY MASS INDEX: 39.25 KG/M2 | WEIGHT: 265 LBS

## 2024-03-10 DIAGNOSIS — G47.33 OSA (OBSTRUCTIVE SLEEP APNEA): ICD-10-CM

## 2024-03-10 PROCEDURE — 95811 POLYSOM 6/>YRS CPAP 4/> PARM: CPT

## 2024-03-13 LAB — STATUS: NORMAL

## 2024-03-19 DIAGNOSIS — G47.33 OSA (OBSTRUCTIVE SLEEP APNEA): Primary | ICD-10-CM

## 2024-03-21 ENCOUNTER — TELEPHONE (OUTPATIENT)
Dept: FAMILY MEDICINE CLINIC | Age: 54
End: 2024-03-21

## 2024-03-21 NOTE — TELEPHONE ENCOUNTER
TIMOTHY for patient informing him of his positive Sleep Apnea results. Order for CPAP was faxed to [facility] at [phone]

## 2024-03-21 NOTE — TELEPHONE ENCOUNTER
----- Message from Marga Santoyo MD sent at 3/19/2024  3:37 PM EDT -----  Please let pt know he has sleep apnea, mask and machine have been ordered    Thanks

## 2024-08-19 DIAGNOSIS — E11.9 TYPE 2 DIABETES MELLITUS WITHOUT COMPLICATION, WITHOUT LONG-TERM CURRENT USE OF INSULIN (HCC): ICD-10-CM

## 2024-08-20 RX ORDER — METFORMIN HYDROCHLORIDE 500 MG/1
TABLET, EXTENDED RELEASE ORAL
Qty: 90 TABLET | Refills: 1 | Status: SHIPPED | OUTPATIENT
Start: 2024-08-20 | End: 2024-08-21 | Stop reason: SDUPTHER

## 2024-08-20 NOTE — TELEPHONE ENCOUNTER
E-scribe request for metformin. Please review and e-scribe if applicable.     Last Visit Date:  8/11/23  Next Visit Date:  8/21/2024    Hemoglobin A1C (%)   Date Value   07/28/2023 6.3   01/06/2023 6.3   05/27/2022 6.1             ( goal A1C is < 7)   No components found for: \"LABMICR\"  No components found for: \"LDLCHOLESTEROL\", \"LDLCALC\"    (goal LDL is <100)   AST (U/L)   Date Value   01/24/2023 27     ALT (U/L)   Date Value   01/24/2023 39     BUN (mg/dL)   Date Value   01/24/2023 14     BP Readings from Last 3 Encounters:   08/11/23 (!) 162/96   07/28/23 (!) 148/92   01/06/23 135/76          (goal 120/80)        Patient Active Problem List:     History of sleep disturbance     New onset type 2 diabetes mellitus (HCC)      ----JF

## 2024-08-21 ENCOUNTER — OFFICE VISIT (OUTPATIENT)
Dept: FAMILY MEDICINE CLINIC | Age: 54
End: 2024-08-21
Payer: COMMERCIAL

## 2024-08-21 VITALS
HEIGHT: 69 IN | HEART RATE: 71 BPM | DIASTOLIC BLOOD PRESSURE: 89 MMHG | BODY MASS INDEX: 38.39 KG/M2 | WEIGHT: 259.2 LBS | SYSTOLIC BLOOD PRESSURE: 136 MMHG

## 2024-08-21 DIAGNOSIS — I10 PRIMARY HYPERTENSION: ICD-10-CM

## 2024-08-21 DIAGNOSIS — E11.00 TYPE 2 DIABETES MELLITUS WITH HYPEROSMOLARITY WITHOUT COMA, WITHOUT LONG-TERM CURRENT USE OF INSULIN (HCC): Primary | ICD-10-CM

## 2024-08-21 DIAGNOSIS — Z12.11 SCREENING FOR COLON CANCER: ICD-10-CM

## 2024-08-21 DIAGNOSIS — Z13.220 SCREENING FOR HYPERLIPIDEMIA: ICD-10-CM

## 2024-08-21 DIAGNOSIS — E11.9 TYPE 2 DIABETES MELLITUS WITHOUT COMPLICATION, WITHOUT LONG-TERM CURRENT USE OF INSULIN (HCC): ICD-10-CM

## 2024-08-21 LAB — HBA1C MFR BLD: 6.5 %

## 2024-08-21 PROCEDURE — 83036 HEMOGLOBIN GLYCOSYLATED A1C: CPT

## 2024-08-21 PROCEDURE — G8417 CALC BMI ABV UP PARAM F/U: HCPCS

## 2024-08-21 PROCEDURE — G8427 DOCREV CUR MEDS BY ELIG CLIN: HCPCS

## 2024-08-21 PROCEDURE — 99213 OFFICE O/P EST LOW 20 MIN: CPT

## 2024-08-21 PROCEDURE — 3075F SYST BP GE 130 - 139MM HG: CPT

## 2024-08-21 PROCEDURE — 4004F PT TOBACCO SCREEN RCVD TLK: CPT

## 2024-08-21 PROCEDURE — 3079F DIAST BP 80-89 MM HG: CPT

## 2024-08-21 PROCEDURE — 3017F COLORECTAL CA SCREEN DOC REV: CPT

## 2024-08-21 PROCEDURE — 2022F DILAT RTA XM EVC RTNOPTHY: CPT

## 2024-08-21 PROCEDURE — 3044F HG A1C LEVEL LT 7.0%: CPT

## 2024-08-21 RX ORDER — METFORMIN HYDROCHLORIDE 500 MG/1
500 TABLET, EXTENDED RELEASE ORAL
Qty: 90 TABLET | Refills: 0 | Status: SHIPPED | OUTPATIENT
Start: 2024-08-21 | End: 2024-11-19

## 2024-08-21 RX ORDER — AMLODIPINE BESYLATE 5 MG/1
5 TABLET ORAL DAILY
Qty: 30 TABLET | Refills: 3 | Status: SHIPPED | OUTPATIENT
Start: 2024-08-21

## 2024-08-21 SDOH — ECONOMIC STABILITY: FOOD INSECURITY: WITHIN THE PAST 12 MONTHS, YOU WORRIED THAT YOUR FOOD WOULD RUN OUT BEFORE YOU GOT MONEY TO BUY MORE.: NEVER TRUE

## 2024-08-21 SDOH — ECONOMIC STABILITY: FOOD INSECURITY: WITHIN THE PAST 12 MONTHS, THE FOOD YOU BOUGHT JUST DIDN'T LAST AND YOU DIDN'T HAVE MONEY TO GET MORE.: NEVER TRUE

## 2024-08-21 SDOH — ECONOMIC STABILITY: INCOME INSECURITY: HOW HARD IS IT FOR YOU TO PAY FOR THE VERY BASICS LIKE FOOD, HOUSING, MEDICAL CARE, AND HEATING?: NOT HARD AT ALL

## 2024-08-21 ASSESSMENT — ENCOUNTER SYMPTOMS
WHEEZING: 0
SHORTNESS OF BREATH: 0
ABDOMINAL PAIN: 0
COUGH: 0

## 2024-08-21 ASSESSMENT — PATIENT HEALTH QUESTIONNAIRE - PHQ9
SUM OF ALL RESPONSES TO PHQ QUESTIONS 1-9: 0
SUM OF ALL RESPONSES TO PHQ9 QUESTIONS 1 & 2: 0
SUM OF ALL RESPONSES TO PHQ QUESTIONS 1-9: 0
SUM OF ALL RESPONSES TO PHQ QUESTIONS 1-9: 0
2. FEELING DOWN, DEPRESSED OR HOPELESS: NOT AT ALL
1. LITTLE INTEREST OR PLEASURE IN DOING THINGS: NOT AT ALL
SUM OF ALL RESPONSES TO PHQ QUESTIONS 1-9: 0

## 2024-08-21 NOTE — PROGRESS NOTES
[facility] - Outpatient Note      Subjective:    Tomas Munguia is a 54 y.o. male with  has a past medical history of Anxiety and Asthma.    Presented to the office today for:  Chief Complaint   Patient presents with    Hypertension     Follow up    Diabetes     Follow up       Hypertension  Pertinent negatives include no chest pain, headaches, palpitations or shortness of breath.   Diabetes  Pertinent negatives for hypoglycemia include no dizziness or headaches. Pertinent negatives for diabetes include no chest pain and no weakness.       Patient has not seen a physician in slightly over a year, wants to get checked up  Patient's last diabetic screen was on 7/20/2023 which was 6.3 at that time.  Repeat A1c today was 6.5  Blood pressure well-controlled, 136/89  Patient's pharmacy closed and needed medication refills today  Patient on metformin 500 mg once a day and Norvasc 5 mg daily  Patient also due for lab work    HM: Due for Cologuard    Review of Systems   Constitutional:  Negative for chills and fever.   HENT:  Negative for congestion.    Respiratory:  Negative for cough, shortness of breath and wheezing.    Cardiovascular:  Negative for chest pain, palpitations and leg swelling.   Gastrointestinal:  Negative for abdominal pain.   Genitourinary:  Negative for dysuria.   Neurological:  Negative for dizziness, weakness, light-headedness, numbness and headaches.       The patient has a   Family History   Problem Relation Age of Onset    COPD Mother         covid    Diabetes Father        Objective:    /89 (Site: Right Upper Arm, Position: Sitting, Cuff Size: Medium Adult)   Pulse 71   Ht 1.753 m (5' 9.02\")   Wt 117.6 kg (259 lb 3.2 oz)   BMI 38.26 kg/m²    BP Readings from Last 3 Encounters:   08/21/24 136/89   08/11/23 (!) 162/96   07/28/23 (!) 148/92       Physical Exam  Constitutional:       General: He is not in acute distress.     Appearance: He 
Visit Information    Have you changed or started any medications since your last visit including any over-the-counter medicines, vitamins, or herbal medicines? no   Are you having any side effects from any of your medications? -  no  Have you stopped taking any of your medications? Is so, why? -  no    Have you seen any other physician or provider since your last visit? No  Have you had any other diagnostic tests since your last visit? No  Have you been seen in the emergency room and/or had an admission to a hospital since we last saw you? No  Have you had your routine dental cleaning in the past 6 months? no    Have you activated your [employer] account? If not, what are your barriers? No:      Patient Care Team:  Marga Santoyo MD as PCP - General (Family Medicine)    Medical History Review  Past Medical, Family, and Social History reviewed and does not contribute to the patient presenting condition    Health Maintenance   Topic Date Due    Pneumococcal 0-64 years Vaccine (1 of 2 - PCV) Never done    Diabetic foot exam  Never done    Diabetic Alb to Cr ratio (uACR) test  Never done    Diabetic retinal exam  Never done    Hepatitis B vaccine (1 of 3 - 19+ 3-dose series) Never done    DTaP/Tdap/Td vaccine (1 - Tdap) Never done    Colorectal Cancer Screen  Never done    Shingles vaccine (1 of 2) Never done    COVID-19 Vaccine (1 - 2023-24 season) Never done    Lipids  01/24/2024    GFR test (Diabetes, CKD 3-4, OR last GFR 15-59)  01/24/2024    Flu vaccine (1) Never done    A1C test (Diabetic or Prediabetic)  07/28/2024    Depression Screen  07/28/2024    Hepatitis C screen  Completed    HIV screen  Completed    Hepatitis A vaccine  Aged Out    Hib vaccine  Aged Out    Polio vaccine  Aged Out    Meningococcal (ACWY) vaccine  Aged Out    Diabetes screen  Discontinued       
Modifier ) Dr. BABITA ALDRICH MD

## 2024-08-21 NOTE — PATIENT INSTRUCTIONS
Thank you for letting us take care of you today. We hope all your questions were addressed. If a question was overlooked or something else comes to mind after you return home, please contact a member of your Care Team listed below.      Your Care Team at [facility] is Team #  Ollie Barone M.D. (Faculty)  Orestes Maradiaga M.D. (Resident)  Anastasiya Sevilla M.D. (Resident)   Alyssa Sunshine M.D. (Resident)  eBthel Conway M.D. (Resident)  Marcy Mota M.D. (Resident)  Quyen Najera., [facility]  Adenike Haney, [facility]  Kristen Ernst, [facility]  Alexandru Lozano, GOYO Farris, [facility]  Arelis Giles, [facility]  Charlene Gonzalez, GOYO Sol (LJ) BREANNA Carrillo (Clinical Practice Manager)  [facility] (Clinical Pharmacist)     Office phone number: [phone]    If you need to get in right away due to illness, please be advised we have \"Same Day\" appointments available Monday-Friday. Please call us at [phone] option #3 to schedule your \"Same Day\" appointment.

## 2024-09-04 ENCOUNTER — TELEPHONE (OUTPATIENT)
Dept: FAMILY MEDICINE CLINIC | Age: 54
End: 2024-09-04

## 2024-09-04 NOTE — TELEPHONE ENCOUNTER
Patient gave verbal to speak with Mildred Ch for his medical. Writer informed for charissa would need to add Mildred Ch to HIPAA. Mildred was calling regarding patient cPAP machine, stated its not working any more. The part where the water goes in is not working properly. Mildred stated patient received this machine from the sleep apnea location. Writer explain there may be a number on the machine, Mildred stated there is not. Writer explain could of went through  [facility] as the DME location as they have sent orders to the clinic regarding the CPAP machine. Writer informed would want to call [facility] as the machine is only 4 months old and insurance will not pay for a new one with a new order due to the machine not being old. Mildred will call [facility] (writer provided number) and speak to them about the CPAP machine being broken and check on options. Constantinr also informed if this is not the location the machine was given by, to call insurance and ask which company was billed for the machine and explain to insurance of what's going on. The insurance company will also be able to inform how to replace. Mildred thanked writer.

## 2024-09-13 ENCOUNTER — HOSPITAL ENCOUNTER (OUTPATIENT)
Age: 54
Setting detail: SPECIMEN
Discharge: HOME | End: 2024-09-13

## 2024-09-13 DIAGNOSIS — I10 PRIMARY HYPERTENSION: ICD-10-CM

## 2024-09-13 DIAGNOSIS — Z13.220 SCREENING FOR HYPERLIPIDEMIA: ICD-10-CM

## 2024-09-13 DIAGNOSIS — E11.00 TYPE 2 DIABETES MELLITUS WITH HYPEROSMOLARITY WITHOUT COMA, WITHOUT LONG-TERM CURRENT USE OF INSULIN (HCC): ICD-10-CM

## 2024-09-13 LAB
ALBUMIN SERPL-MCNC: 4.3 G/DL (ref 3.5–5.2)
ALBUMIN/GLOB SERPL: 1 {RATIO} (ref 1–2.5)
ALP SERPL-CCNC: 92 U/L (ref 40–129)
ALT SERPL-CCNC: 45 U/L (ref 10–50)
ANION GAP SERPL CALCULATED.3IONS-SCNC: 12 MMOL/L (ref 9–16)
AST SERPL-CCNC: 38 U/L (ref 10–50)
BILIRUB SERPL-MCNC: 0.3 MG/DL (ref 0–1.2)
BUN SERPL-MCNC: 13 MG/DL (ref 6–20)
CALCIUM SERPL-MCNC: 9.4 MG/DL (ref 8.6–10.4)
CHLORIDE SERPL-SCNC: 101 MMOL/L (ref 98–107)
CHOLEST SERPL-MCNC: 177 MG/DL (ref 0–199)
CHOLESTEROL/HDL RATIO: 5
CO2 SERPL-SCNC: 25 MMOL/L (ref 20–31)
CREAT SERPL-MCNC: 0.9 MG/DL (ref 0.7–1.2)
CREAT UR-MCNC: 450 MG/DL (ref 39–259)
GFR, ESTIMATED: >90 ML/MIN/1.73M2
GLUCOSE SERPL-MCNC: 116 MG/DL (ref 74–99)
HDLC SERPL-MCNC: 38 MG/DL
LDLC SERPL CALC-MCNC: 103 MG/DL (ref 0–100)
MICROALBUMIN UR-MCNC: 13 MG/L (ref 0–20)
MICROALBUMIN/CREAT UR-RTO: 3 MCG/MG CREAT (ref 0–17)
POTASSIUM SERPL-SCNC: 3.8 MMOL/L (ref 3.7–5.3)
PROT SERPL-MCNC: 7.5 G/DL (ref 6.6–8.7)
SODIUM SERPL-SCNC: 138 MMOL/L (ref 136–145)
TRIGL SERPL-MCNC: 184 MG/DL
VLDLC SERPL CALC-MCNC: 37 MG/DL

## 2024-09-15 DIAGNOSIS — E78.49 OTHER HYPERLIPIDEMIA: Primary | ICD-10-CM

## 2024-09-15 RX ORDER — ROSUVASTATIN CALCIUM 20 MG/1
20 TABLET, COATED ORAL NIGHTLY
Qty: 30 TABLET | Refills: 3 | Status: SHIPPED | OUTPATIENT
Start: 2024-09-15

## 2024-09-16 ENCOUNTER — TELEPHONE (OUTPATIENT)
Dept: FAMILY MEDICINE CLINIC | Age: 54
End: 2024-09-16

## 2024-09-16 NOTE — TELEPHONE ENCOUNTER
Writer tried reaching out to the patient, no answer left a message for the patient to call the office back.        Sorry I tried calling this patient over the weekend.   He needs to be on a statin for cholesterol control. His risk score is high!   I sent over a script for crestor 20mg to his pharmacy. He can pick it up and start it. If he has any questions, please let me know!     Dr. Hines

## 2024-10-29 ENCOUNTER — TELEPHONE (OUTPATIENT)
Dept: FAMILY MEDICINE CLINIC | Age: 54
End: 2024-10-29

## 2024-10-29 NOTE — TELEPHONE ENCOUNTER
Writer reached out to the patient to reschedule the appointment on 11/21/2024, left a vm for the patient to call the office back to reschedule, [place] message sent as well. Patient appointment cancelled at this time.

## 2024-11-18 DIAGNOSIS — E11.9 TYPE 2 DIABETES MELLITUS WITHOUT COMPLICATION, WITHOUT LONG-TERM CURRENT USE OF INSULIN (HCC): ICD-10-CM

## 2024-11-18 RX ORDER — METFORMIN HYDROCHLORIDE 500 MG/1
500 TABLET, EXTENDED RELEASE ORAL
Qty: 90 TABLET | Refills: 0 | Status: SHIPPED | OUTPATIENT
Start: 2024-11-18

## 2024-11-18 NOTE — TELEPHONE ENCOUNTER
Last visit: 08/21/2024  Last Med refill: 08/21/2024  Does patient have enough medication for 72 hours: No:     Next Visit Date:  No future appointments.    Health Maintenance   Topic Date Due    Pneumococcal 0-64 years Vaccine (1 of 2 - PCV) Never done    Diabetic foot exam  Never done    Diabetic retinal exam  Never done    Hepatitis B vaccine (1 of 3 - 19+ 3-dose series) Never done    DTaP/Tdap/Td vaccine (1 - Tdap) Never done    Colorectal Cancer Screen  Never done    Shingles vaccine (1 of 2) Never done    Flu vaccine (1) Never done    COVID-19 Vaccine (1 - 2023-24 season) Never done    A1C test (Diabetic or Prediabetic)  08/21/2025    Depression Screen  08/21/2025    Diabetic Alb to Cr ratio (uACR) test  09/13/2025    Lipids  09/13/2025    GFR test (Diabetes, CKD 3-4, OR last GFR 15-59)  09/13/2025    Hepatitis C screen  Completed    HIV screen  Completed    Hepatitis A vaccine  Aged Out    Hib vaccine  Aged Out    Polio vaccine  Aged Out    Meningococcal (ACWY) vaccine  Aged Out    Diabetes screen  Discontinued       Hemoglobin A1C (%)   Date Value   08/21/2024 6.5   07/28/2023 6.3   01/06/2023 6.3             ( goal A1C is < 7)   No components found for: \"LABMICR\"  No components found for: \"LDLCHOLESTEROL\", \"LDLCALC\"    (goal LDL is <100)   AST (U/L)   Date Value   09/13/2024 38     ALT (U/L)   Date Value   09/13/2024 45     BUN (mg/dL)   Date Value   09/13/2024 13     BP Readings from Last 3 Encounters:   08/21/24 136/89   08/11/23 (!) 162/96   07/28/23 (!) 148/92          (goal 120/80)    All Future Testing planned in [facility]  Lab Frequency Next Occurrence               Patient Active Problem List:     History of sleep disturbance     New onset type 2 diabetes mellitus (HCC)

## 2025-01-14 DIAGNOSIS — E78.49 OTHER HYPERLIPIDEMIA: ICD-10-CM

## 2025-01-14 NOTE — TELEPHONE ENCOUNTER
Last visit: 08/21/2024  Last Med refill: 09/15/2024  Does patient have enough medication for 72 hours: No: [facility] for a return call to schedule for med refills     Next Visit Date:  No future appointments.    Health Maintenance   Topic Date Due    Pneumococcal 0-64 years Vaccine (1 of 2 - PCV) Never done    Diabetic foot exam  Never done    Diabetic retinal exam  Never done    Hepatitis B vaccine (1 of 3 - 19+ 3-dose series) Never done    DTaP/Tdap/Td vaccine (1 - Tdap) Never done    Colorectal Cancer Screen  Never done    Shingles vaccine (1 of 2) Never done    Flu vaccine (1) Never done    COVID-19 Vaccine (1 - 2023-24 season) Never done    A1C test (Diabetic or Prediabetic)  08/21/2025    Depression Screen  08/21/2025    Diabetic Alb to Cr ratio (uACR) test  09/13/2025    Lipids  09/13/2025    GFR test (Diabetes, CKD 3-4, OR last GFR 15-59)  09/13/2025    Hepatitis C screen  Completed    HIV screen  Completed    Hepatitis A vaccine  Aged Out    Hib vaccine  Aged Out    Polio vaccine  Aged Out    Meningococcal (ACWY) vaccine  Aged Out    Diabetes screen  Discontinued       Hemoglobin A1C (%)   Date Value   08/21/2024 6.5   07/28/2023 6.3   01/06/2023 6.3             ( goal A1C is < 7)   No components found for: \"LABMICR\"  No components found for: \"LDLCHOLESTEROL\", \"LDLCALC\"    (goal LDL is <100)   AST (U/L)   Date Value   09/13/2024 38     ALT (U/L)   Date Value   09/13/2024 45     BUN (mg/dL)   Date Value   09/13/2024 13     BP Readings from Last 3 Encounters:   08/21/24 136/89   08/11/23 (!) 162/96   07/28/23 (!) 148/92          (goal 120/80)    All Future Testing planned in [facility]  Lab Frequency Next Occurrence               Patient Active Problem List:     History of sleep disturbance     New onset type 2 diabetes mellitus (HCC)

## 2025-01-15 RX ORDER — ROSUVASTATIN CALCIUM 20 MG/1
20 TABLET, COATED ORAL NIGHTLY
Qty: 30 TABLET | Refills: 3 | Status: SHIPPED | OUTPATIENT
Start: 2025-01-15

## 2025-02-21 DIAGNOSIS — E11.9 TYPE 2 DIABETES MELLITUS WITHOUT COMPLICATION, WITHOUT LONG-TERM CURRENT USE OF INSULIN (HCC): ICD-10-CM

## 2025-02-21 RX ORDER — METFORMIN HYDROCHLORIDE 500 MG/1
500 TABLET, EXTENDED RELEASE ORAL
Qty: 90 TABLET | Refills: 0 | Status: SHIPPED | OUTPATIENT
Start: 2025-02-21

## 2025-02-21 NOTE — TELEPHONE ENCOUNTER
Last visit: 8.21.24  Last Med refill: 11.23.24  Does patient have enough medication for 72 hours: Yes    Next Visit Date:  No future appointments.    Health Maintenance   Topic Date Due    Diabetic foot exam  Never done    Diabetic retinal exam  Never done    Hepatitis B vaccine (1 of 3 - 19+ 3-dose series) Never done    DTaP/Tdap/Td vaccine (1 - Tdap) Never done    Pneumococcal 50+ years Vaccine (1 of 2 - PCV) Never done    Colorectal Cancer Screen  Never done    Shingles vaccine (1 of 2) Never done    Flu vaccine (1) Never done    COVID-19 Vaccine (1 - 2024-25 season) Never done    A1C test (Diabetic or Prediabetic)  08/21/2025    Depression Screen  08/21/2025    Diabetic Alb to Cr ratio (uACR) test  09/13/2025    Lipids  09/13/2025    GFR test (Diabetes, CKD 3-4, OR last GFR 15-59)  09/13/2025    Hepatitis C screen  Completed    HIV screen  Completed    Hepatitis A vaccine  Aged Out    Hib vaccine  Aged Out    Polio vaccine  Aged Out    Meningococcal (ACWY) vaccine  Aged Out    Diabetes screen  Discontinued       Hemoglobin A1C (%)   Date Value   08/21/2024 6.5   07/28/2023 6.3   01/06/2023 6.3             ( goal A1C is < 7)   No components found for: \"LABMICR\"  No components found for: \"LDLCHOLESTEROL\", \"LDLCALC\"    (goal LDL is <100)   AST (U/L)   Date Value   09/13/2024 38     ALT (U/L)   Date Value   09/13/2024 45     BUN (mg/dL)   Date Value   09/13/2024 13     BP Readings from Last 3 Encounters:   08/21/24 136/89   08/11/23 (!) 162/96   07/28/23 (!) 148/92          (goal 120/80)    All Future Testing planned in [facility]  Lab Frequency Next Occurrence               Patient Active Problem List:     History of sleep disturbance     New onset type 2 diabetes mellitus (HCC)

## 2025-05-23 DIAGNOSIS — E11.9 TYPE 2 DIABETES MELLITUS WITHOUT COMPLICATION, WITHOUT LONG-TERM CURRENT USE OF INSULIN (HCC): ICD-10-CM

## 2025-05-23 RX ORDER — METFORMIN HYDROCHLORIDE 500 MG/1
500 TABLET, EXTENDED RELEASE ORAL
Qty: 90 TABLET | Refills: 0 | Status: SHIPPED | OUTPATIENT
Start: 2025-05-23

## 2025-05-23 NOTE — TELEPHONE ENCOUNTER
Last visit: 8-21-24  Last Med refill:   Does patient have enough medication for 72 hours: No:     Next Visit Date:  No future appointments.    Health Maintenance   Topic Date Due    Diabetic foot exam  Never done    Diabetic retinal exam  Never done    Hepatitis B vaccine (1 of 3 - 19+ 3-dose series) Never done    DTaP/Tdap/Td vaccine (1 - Tdap) Never done    Pneumococcal 50+ years Vaccine (1 of 2 - PCV) Never done    Colorectal Cancer Screen  Never done    Shingles vaccine (1 of 2) Never done    COVID-19 Vaccine (1 - 2024-25 season) Never done    Flu vaccine (Season Ended) 08/01/2025    A1C test (Diabetic or Prediabetic)  08/21/2025    Depression Screen  08/21/2025    Diabetic Alb to Cr ratio (uACR) test  09/13/2025    Lipids  09/13/2025    GFR test (Diabetes, CKD 3-4, OR last GFR 15-59)  09/13/2025    Hepatitis C screen  Completed    HIV screen  Completed    Hepatitis A vaccine  Aged Out    Hib vaccine  Aged Out    Polio vaccine  Aged Out    Meningococcal (ACWY) vaccine  Aged Out    Meningococcal B vaccine  Aged Out    Diabetes screen  Discontinued       Hemoglobin A1C (%)   Date Value   08/21/2024 6.5   07/28/2023 6.3   01/06/2023 6.3             ( goal A1C is < 7)   No components found for: \"LABMICR\"  No components found for: \"LDLCHOLESTEROL\", \"LDLCALC\"    (goal LDL is <100)   AST (U/L)   Date Value   09/13/2024 38     ALT (U/L)   Date Value   09/13/2024 45     BUN (mg/dL)   Date Value   09/13/2024 13     BP Readings from Last 3 Encounters:   08/21/24 136/89   08/11/23 (!) 162/96   07/28/23 (!) 148/92          (goal 120/80)    All Future Testing planned in [facility]  Lab Frequency Next Occurrence               Patient Active Problem List:     History of sleep disturbance     New onset type 2 diabetes mellitus (HCC)